# Patient Record
(demographics unavailable — no encounter records)

---

## 2025-05-28 NOTE — HISTORY OF PRESENT ILLNESS
[de-identified] : The patient is a 33 year old Right hand dominant male who presents today complaining of B/L knee pain, L worse than R.   Date of Injury/Onset: ~06/07/23 Pain:    At Rest: 0/10  With Activity:  8/10  Mechanism of injury: recent onset of acute pain 2 weeks ago playing volleyball This is NOT a Work Related Injury being treated under Worker's Compensation. This is NOT an athletic injury occurring associated with an interscholastic or organized sports team. Quality of symptoms: Instability Improves with: HEP, compression sleeve Worse with: inc activity, sports, going from sit to stand  Treatment/Imaging/Studies Since Last Visit: finished visco for BL knee 8/26/24 	Reports Available For Review Today: MRI LT KNEE @ [facility]  7/6/23 Change since last visit: Patient reports no real improvement of symptoms after gel injections last summer. Reports onset of new pain recent while playing volleyball. Sharp pain with FWB during extension. Since then pain with going down stairs and after prolonged sitting. Pain resolves with activity and use of reparell sleeves. Biggest complaint is instability Out of work/sport: currently working  School/Sport/Position/Occupation: Sales  Additional Information: med hx BL patella subluxation and R knee MPFL ligament reconstruction with semitendonsis allograft with VMO advancement with medial retinacular repair, abrasion arthroplasty medial patellar facet, patellar tendon repair. Date of Surgery: 11/03/23

## 2025-05-28 NOTE — DATA REVIEWED
[FreeTextEntry1] : 05/20.24 [facility]-Ray Examination of the RIGHT KNEE: 4 views: + patella central lucency noted on lateral xray  05/30/24 [facility] MRI of the Right Knee: 1. Moderate-grade partial tearing of the central proximal patella tendon insertion where there is reactive marrow edema and cysts in the inferior patella and central patella and patellofemoral compartment arthrosis with full-thickness chondral defect measuring 2 cm, joint space narrowing and osteophytes with moderate effusion ,synovitis and moderate chronic appearing sprain of the medial patellofemoral ligament with infrapatellar synovitis without loose body. Clinical correlation regarding possible acute trauma superimposed on chronic patella tendon pathology and patellofemoral instability is recommended. 2. Findings consistent with bone island in the posterior proximal tibial metaphysis which appears nonaggressive.   06/06/23 [facility] MRI Left Knee: 1. Findings suggesting a recent lateral patella dislocation episode, likely superimposed on chronic patellofemoral instability, with moderate sprain of the medial patellofemoral ligament and marrow edema in the inferior medial patella measuring 2.3 cm along with chondral loss and joint space narrowing in the patellofemoral compartment and slight subchondral bony remodeling in the lateral femoral trochlea. 2. Mild ACL sprain, mild PCL sprain, mild MCL sprain, ACL ganglion within the intercondylar eminence and distal quadriceps tendinopathy. 3. Mild partial tearing involving the central deep patella tendon insertion at the patella measuring I cm with surrounding bursitis and soft tissue swelling without full-thickness discontinuity or retraction associated with moderate patella tendinitis. 4. No evidence of meniscal tear. 5. Clinical correlation and follow up is recommended.

## 2025-05-28 NOTE — PHYSICAL EXAM
[de-identified] : Neurologic: normal sensation, normal mood and affect, orientated and able to communicate. Right Knee: no effusion 4/5 quad strength quad tendon tenderness patellar compression tenderness crepitus of the patella   Left Knee: 5/10 pain patellar tendon inferior pole patella patella apprehension  mediolateral  patella of facet tenderness

## 2025-05-28 NOTE — DISCUSSION/SUMMARY
[de-identified] :  Reviewed all MRI images with patient, interpretation was provided. Physical therapy prescribed for strengthening and stretching. Patient recently tried physical therapy and/or home exercise, anti-inflammatories, rest, without any improvement in pain.  MRI Arthrogram of the left knee to eval for patella dislocation and patella chondromalacia Follow up after scan    Email provided. ----------------------------------------------- Home Exercise The patient is instructed on a home exercise program.  GIANNA DESIR Acting as a Scribe for Dr. Cindy MCKEON, Gianna Desir, attest that this documentation has been prepared under the direction and in the presence of Provider Alex White MD.  Activity Modification The patient was advised to modify their activities.  Dx / Natural History The patient was advised of the diagnosis.  The natural history of the pathology was explained in full to the patient in layman's terms.  Several different treatment options were discussed and explained in full to the patient including the risks and benefits of both surgical and non-surgical treatments.  All questions and concerns were answered.  Pain Guide Activities The patient was advised to let pain guide the gradual advancement of activities.  RICE I explained to the patient that rest, ice, compression, and elevation would benefit them.  They may return to activity after follow-up or when they no longer have any pain.  The patient's current medication management of their orthopedic diagnosis was reviewed today: (1) We discussed a comprehensive treatment plan that included possible pharmaceutical management involving the use of prescription strength medications including but not limited to options such as oral Naprosyn 500mg BID, once daily Meloxicam 15 mg, or 500-650 mg Tylenol versus over the counter oral medications and topical prescription NSAID Pennsaid vs over the counter Voltaren gel. (2) There is a moderate risk of morbidity with further treatment, especially from use of prescription strength medications and possible side effects of these medications which include upset stomach with oral medications, skin reactions to topical medications and cardiac/renal issues with long term use. (3) I recommended that the patient follow-up with their medical physician to discuss any significant specific potential issues with long term medication use such as interactions with current medications or with exacerbation of underlying medical comorbidities. (4) The benefits and risks associated with use of injectable, oral or topical, prescription and over the counter anti-inflammatory medications were discussed with the patient. The patient voiced understanding of the risks including but not limited to bleeding, stroke, kidney dysfunction, heart disease, and were referred to the black box warning label for further information.

## 2025-05-28 NOTE — PHYSICAL EXAM
[de-identified] : Neurologic: normal sensation, normal mood and affect, orientated and able to communicate. Right Knee: no effusion 4/5 quad strength quad tendon tenderness patellar compression tenderness crepitus of the patella   Left Knee: 5/10 pain patellar tendon inferior pole patella patella apprehension  mediolateral  patella of facet tenderness

## 2025-05-28 NOTE — DISCUSSION/SUMMARY
[de-identified] :  Reviewed all MRI images with patient, interpretation was provided. Physical therapy prescribed for strengthening and stretching. Patient recently tried physical therapy and/or home exercise, anti-inflammatories, rest, without any improvement in pain.  MRI Arthrogram of the left knee to eval for patella dislocation and patella chondromalacia Follow up after scan    Email provided. ----------------------------------------------- Home Exercise The patient is instructed on a home exercise program.  GIANNA DESIR Acting as a Scribe for Dr. Cindy MCKEON, Gianna Desir, attest that this documentation has been prepared under the direction and in the presence of Provider Alex White MD.  Activity Modification The patient was advised to modify their activities.  Dx / Natural History The patient was advised of the diagnosis.  The natural history of the pathology was explained in full to the patient in layman's terms.  Several different treatment options were discussed and explained in full to the patient including the risks and benefits of both surgical and non-surgical treatments.  All questions and concerns were answered.  Pain Guide Activities The patient was advised to let pain guide the gradual advancement of activities.  RICE I explained to the patient that rest, ice, compression, and elevation would benefit them.  They may return to activity after follow-up or when they no longer have any pain.  The patient's current medication management of their orthopedic diagnosis was reviewed today: (1) We discussed a comprehensive treatment plan that included possible pharmaceutical management involving the use of prescription strength medications including but not limited to options such as oral Naprosyn 500mg BID, once daily Meloxicam 15 mg, or 500-650 mg Tylenol versus over the counter oral medications and topical prescription NSAID Pennsaid vs over the counter Voltaren gel. (2) There is a moderate risk of morbidity with further treatment, especially from use of prescription strength medications and possible side effects of these medications which include upset stomach with oral medications, skin reactions to topical medications and cardiac/renal issues with long term use. (3) I recommended that the patient follow-up with their medical physician to discuss any significant specific potential issues with long term medication use such as interactions with current medications or with exacerbation of underlying medical comorbidities. (4) The benefits and risks associated with use of injectable, oral or topical, prescription and over the counter anti-inflammatory medications were discussed with the patient. The patient voiced understanding of the risks including but not limited to bleeding, stroke, kidney dysfunction, heart disease, and were referred to the black box warning label for further information.

## 2025-05-28 NOTE — HISTORY OF PRESENT ILLNESS
[de-identified] : The patient is a 33 year old Right hand dominant male who presents today complaining of B/L knee pain, L worse than R.   Date of Injury/Onset: ~06/07/23 Pain:    At Rest: 0/10  With Activity:  8/10  Mechanism of injury: recent onset of acute pain 2 weeks ago playing volleyball This is NOT a Work Related Injury being treated under Worker's Compensation. This is NOT an athletic injury occurring associated with an interscholastic or organized sports team. Quality of symptoms: Instability Improves with: HEP, compression sleeve Worse with: inc activity, sports, going from sit to stand  Treatment/Imaging/Studies Since Last Visit: finished visco for BL knee 8/26/24 	Reports Available For Review Today: MRI LT KNEE @ [facility]  7/6/23 Change since last visit: Patient reports no real improvement of symptoms after gel injections last summer. Reports onset of new pain recent while playing volleyball. Sharp pain with FWB during extension. Since then pain with going down stairs and after prolonged sitting. Pain resolves with activity and use of reparell sleeves. Biggest complaint is instability Out of work/sport: currently working  School/Sport/Position/Occupation: Sales  Additional Information: med hx BL patella subluxation and R knee MPFL ligament reconstruction with semitendonsis allograft with VMO advancement with medial retinacular repair, abrasion arthroplasty medial patellar facet, patellar tendon repair. Date of Surgery: 11/03/23

## 2025-06-21 NOTE — PHYSICAL EXAM
[de-identified] : Neurologic: normal sensation, normal mood and affect, orientated and able to communicate. Right Knee: no effusion 4/5 quad strength quad tendon tenderness patellar compression tenderness crepitus of the patella   Left Knee: 5/10 pain patellar tendon inferior pole patella patella apprehension  mediolateral  patella of facet tenderness

## 2025-06-21 NOTE — DATA REVIEWED
[FreeTextEntry1] : 6/5/25 [facility] MRA Left Knee: This scan was reviewed and interpreted by Dr. White, and his findings are- IMPRESSION: Patella marcelino and mild lateral patellar subluxation along with an elevated TT-TG interval. 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  There is a focal deep chondral fissure at the lateral tibial plateau and mild chondral fibrillation adjacent to the fissure.  There is mild intrasubstance degeneration of the posterior root attachment of the medial meniscus without a discrete tear.  There is resolution of the previously seen bone marrow edema within the inferior pole of the patella.  05/20/24 [facility]-Ray Examination of the RIGHT KNEE: 4 views: + patella central lucency noted on lateral xray  05/30/24 [facility] MRI of the Right Knee: 1. Moderate-grade partial tearing of the central proximal patella tendon insertion where there is reactive marrow edema and cysts in the inferior patella and central patella and patellofemoral compartment arthrosis with full-thickness chondral defect measuring 2 cm, joint space narrowing and osteophytes with moderate effusion ,synovitis and moderate chronic appearing sprain of the medial patellofemoral ligament with infrapatellar synovitis without loose body. Clinical correlation regarding possible acute trauma superimposed on chronic patella tendon pathology and patellofemoral instability is recommended. 2. Findings consistent with bone island in the posterior proximal tibial metaphysis which appears nonaggressive.  06/06/23 [facility] MRI Left Knee: 1. Findings suggesting a recent lateral patella dislocation episode, likely superimposed on chronic patellofemoral instability, with moderate sprain of the medial patellofemoral ligament and marrow edema in the inferior medial patella measuring 2.3 cm along with chondral loss and joint space narrowing in the patellofemoral compartment and slight subchondral bony remodeling in the lateral femoral trochlea. 2. Mild ACL sprain, mild PCL sprain, mild MCL sprain, ACL ganglion within the intercondylar eminence and distal quadriceps tendinopathy. 3. Mild partial tearing involving the central deep patella tendon insertion at the patella measuring I cm with surrounding bursitis and soft tissue swelling without full-thickness discontinuity or retraction associated with moderate patella tendinitis. 4. No evidence of meniscal tear. 5. Clinical correlation and follow up is recommended.

## 2025-06-21 NOTE — DISCUSSION/SUMMARY
[de-identified] : Reviewed all MRA images with patient today and interpretation was provided. Patient was given prescription of formal physical therapy for strengthening and stretching. Doctor email provided to patient. Patient will follow up as needed.     ----------------------------------------------- Home Exercise The patient is instructed on a home exercise program.   ZAID PERKINS Acting as a Scribe for Dr. Cindy MCKEON, Zaid Perkins, attest that this documentation has been prepared under the direction and in the presence of Provider Dr. White.   Activity Modification The patient was advised to modify their activities.   Dx / Natural History The patient was advised of the diagnosis. The natural history of the pathology was explained in full to the patient in layman's terms. Several different treatment options were discussed and explained in full to the patient including the risks and benefits of both surgical and non-surgical treatments.  All questions and concerns were answered.   Pain Guide Activities The patient was advised to let pain guide the gradual advancement of activities.   RICE I explained to the patient that rest, ice, compression, and elevation would benefit them. They may return to activity after follow-up or when they no longer have any pain.   The patient's current medication management of their orthopedic diagnosis was reviewed today: (1) We discussed a comprehensive treatment plan that included possible pharmaceutical management involving the use of prescription strength medications including but not limited to options such as oral Naprosyn 500mg BID, once daily Meloxicam 15 mg, or 500-650 mg Tylenol versus over the counter oral medications and topical prescription NSAID Pennsaid vs over the counter Voltaren gel. (2) There is a moderate risk of morbidity with further treatment, especially from use of prescription strength medications and possible side effects of these medications which include upset stomach with oral medications, skin reactions to topical medications and cardiac/renal issues with long term use. (3) I recommended that the patient follow-up with their medical physician to discuss any significant specific potential issues with long term medication use such as interactions with current medications or with exacerbation of underlying medical comorbidities. (4) The benefits and risks associated with use of injectable, oral or topical, prescription and over the counter anti-inflammatory medications were discussed with the patient. The patient voiced understanding of the risks including but not limited to bleeding, stroke, kidney dysfunction, heart disease, and were referred to the black box warning label for further information.

## 2025-06-21 NOTE — PHYSICAL EXAM
[de-identified] : Neurologic: normal sensation, normal mood and affect, orientated and able to communicate. Right Knee: no effusion 4/5 quad strength quad tendon tenderness patellar compression tenderness crepitus of the patella   Left Knee: 5/10 pain patellar tendon inferior pole patella patella apprehension  mediolateral  patella of facet tenderness

## 2025-06-21 NOTE — ADDENDUM
[FreeTextEntry1] : Documented by Kishor Vazquez acting as a scribe for Dr. White and Flash Amaral PA-C on 06/16/2025 and was presence for the following sections: Physical Exam; Data Reviewed; Assessment; Discussion/Summary. All medical record entries made by the Scribe were at my, Dr. White, and Flash Amaral, direction and personally dictated by me on 06/16/2025. I have reviewed the chart and agree that the record accurately reflects my personal performance of the history, physical exam, procedure and imaging.

## 2025-06-21 NOTE — HISTORY OF PRESENT ILLNESS
[de-identified] : The patient is a 35 year old Right hand dominant male who presents today complaining of B/L knee pain, L worse than R.   Date of Injury/Onset: ~06/07/23 Pain:    At Rest: 0/10  With Activity:  7/10  Mechanism of injury: recent onset of acute pain 2 weeks ago playing volleyball This is NOT a Work Related Injury being treated under Worker's Compensation. This is NOT an athletic injury occurring associated with an interscholastic or organized sports team. Quality of symptoms: Instability Improves with: HEP, compression sleeve Worse with: inc activity, sports, going from sit to stand  Treatment/Imaging/Studies Since Last Visit: finished visco for BL knee 8/26/24 	Reports Available For Review Today: MRI LT KNEE @  6/5/25 Change since last visit: Played VB 4 times last week and had minimal pain  Out of work/sport: currently working  School/Sport/Position/Occupation: Sales  Additional Information: med hx BL patella subluxation and R knee MPFL ligament reconstruction with semitendonsis allograft with VMO advancement with medial retinacular repair, abrasion arthroplasty medial patellar facet, patellar tendon repair. Date of Surgery: 11/03/23

## 2025-06-21 NOTE — DISCUSSION/SUMMARY
[de-identified] : Reviewed all MRA images with patient today and interpretation was provided. Patient was given prescription of formal physical therapy for strengthening and stretching. Doctor email provided to patient. Patient will follow up as needed.     ----------------------------------------------- Home Exercise The patient is instructed on a home exercise program.   ZAID PERKINS Acting as a Scribe for Dr. Cindy MCKEON, Zaid Perkins, attest that this documentation has been prepared under the direction and in the presence of Provider Dr. White.   Activity Modification The patient was advised to modify their activities.   Dx / Natural History The patient was advised of the diagnosis. The natural history of the pathology was explained in full to the patient in layman's terms. Several different treatment options were discussed and explained in full to the patient including the risks and benefits of both surgical and non-surgical treatments.  All questions and concerns were answered.   Pain Guide Activities The patient was advised to let pain guide the gradual advancement of activities.   RICE I explained to the patient that rest, ice, compression, and elevation would benefit them. They may return to activity after follow-up or when they no longer have any pain.   The patient's current medication management of their orthopedic diagnosis was reviewed today: (1) We discussed a comprehensive treatment plan that included possible pharmaceutical management involving the use of prescription strength medications including but not limited to options such as oral Naprosyn 500mg BID, once daily Meloxicam 15 mg, or 500-650 mg Tylenol versus over the counter oral medications and topical prescription NSAID Pennsaid vs over the counter Voltaren gel. (2) There is a moderate risk of morbidity with further treatment, especially from use of prescription strength medications and possible side effects of these medications which include upset stomach with oral medications, skin reactions to topical medications and cardiac/renal issues with long term use. (3) I recommended that the patient follow-up with their medical physician to discuss any significant specific potential issues with long term medication use such as interactions with current medications or with exacerbation of underlying medical comorbidities. (4) The benefits and risks associated with use of injectable, oral or topical, prescription and over the counter anti-inflammatory medications were discussed with the patient. The patient voiced understanding of the risks including but not limited to bleeding, stroke, kidney dysfunction, heart disease, and were referred to the black box warning label for further information.

## 2025-06-21 NOTE — HISTORY OF PRESENT ILLNESS
[de-identified] : The patient is a 35 year old Right hand dominant male who presents today complaining of B/L knee pain, L worse than R.   Date of Injury/Onset: ~06/07/23 Pain:    At Rest: 0/10  With Activity:  7/10  Mechanism of injury: recent onset of acute pain 2 weeks ago playing volleyball This is NOT a Work Related Injury being treated under Worker's Compensation. This is NOT an athletic injury occurring associated with an interscholastic or organized sports team. Quality of symptoms: Instability Improves with: HEP, compression sleeve Worse with: inc activity, sports, going from sit to stand  Treatment/Imaging/Studies Since Last Visit: finished visco for BL knee 8/26/24 	Reports Available For Review Today: MRI LT KNEE @  6/5/25 Change since last visit: Played VB 4 times last week and had minimal pain  Out of work/sport: currently working  School/Sport/Position/Occupation: Sales  Additional Information: med hx BL patella subluxation and R knee MPFL ligament reconstruction with semitendonsis allograft with VMO advancement with medial retinacular repair, abrasion arthroplasty medial patellar facet, patellar tendon repair. Date of Surgery: 11/03/23